# Patient Record
Sex: FEMALE | Race: ASIAN | NOT HISPANIC OR LATINO | URBAN - METROPOLITAN AREA
[De-identification: names, ages, dates, MRNs, and addresses within clinical notes are randomized per-mention and may not be internally consistent; named-entity substitution may affect disease eponyms.]

---

## 2019-08-03 ENCOUNTER — EMERGENCY (EMERGENCY)
Facility: HOSPITAL | Age: 49
LOS: 1 days | Discharge: HOME | End: 2019-08-03
Attending: EMERGENCY MEDICINE | Admitting: EMERGENCY MEDICINE
Payer: COMMERCIAL

## 2019-08-03 VITALS
HEART RATE: 80 BPM | OXYGEN SATURATION: 98 % | SYSTOLIC BLOOD PRESSURE: 128 MMHG | WEIGHT: 132.28 LBS | TEMPERATURE: 98 F | RESPIRATION RATE: 17 BRPM | DIASTOLIC BLOOD PRESSURE: 89 MMHG

## 2019-08-03 PROCEDURE — 99283 EMERGENCY DEPT VISIT LOW MDM: CPT

## 2019-08-03 PROCEDURE — 99053 MED SERV 10PM-8AM 24 HR FAC: CPT

## 2019-08-03 NOTE — ED PROVIDER NOTE - NSFOLLOWUPINSTRUCTIONS_ED_ALL_ED_FT
Subconjunctival Hemorrhage  Image   Subconjunctival hemorrhage is bleeding that happens between the white part of your eye (sclera) and the clear membrane that covers the outside of your eye (conjunctiva). There are many tiny blood vessels near the surface of your eye. A subconjunctival hemorrhage happens when one or more of these vessels breaks and bleeds, causing a red patch to appear on your eye. This is similar to a bruise.    Depending on the amount of bleeding, the red patch may only cover a small area of your eye or it may cover the entire visible part of the sclera. If a lot of blood collects under the conjunctiva, there may also be swelling. Subconjunctival hemorrhages do not affect your vision or cause pain, but your eye may feel irritated if there is swelling. Subconjunctival hemorrhages usually do not require treatment, and they disappear on their own within two weeks.    What are the causes?  This condition may be caused by:  Mild trauma, such as rubbing your eye too hard.  Severe trauma or blunt injuries.  Coughing, sneezing, or vomiting.  Straining, such as when lifting a heavy object.  High blood pressure.  Recent eye surgery.  A history of diabetes.  Certain medicines, especially blood thinners (anticoagulants).  Other conditions, such as eye tumors, bleeding disorders, or blood vessel abnormalities.  Subconjunctival hemorrhages can happen without an obvious cause.    What are the signs or symptoms?  Symptoms of this condition include:  A bright red or dark red patch on the white part of the eye.  The red area may spread out to cover a larger area of the eye before it goes away.  The red area may turn brownish-yellow before it goes away.  Swelling.  Mild eye irritation.  How is this diagnosed?  This condition is diagnosed with a physical exam. If your subconjunctival hemorrhage was caused by trauma, your health care provider may refer you to an eye specialist (ophthalmologist) or another specialist to check for other injuries. You may have other tests, including:  An eye exam.  A blood pressure check.  Blood tests to check for bleeding disorders.  If your subconjunctival hemorrhage was caused by trauma, X-rays or a CT scan may be done to check for other injuries.    How is this treated?  Usually, no treatment is needed. Your health care provider may recommend eye drops or cold compresses to help with discomfort.    Follow these instructions at home:  Take over-the-counter and prescription medicines only as directed by your health care provider.  Use eye drops or cold compresses to help with discomfort as directed by your health care provider.  Avoid activities, things, and environments that may irritate or injure your eye.  Keep all follow-up visits as told by your health care provider. This is important.  Contact a health care provider if:  You have pain in your eye.  The bleeding does not go away within 3 weeks.  You keep getting new subconjunctival hemorrhages.  Get help right away if:  Your vision changes or you have difficulty seeing.  You suddenly develop severe sensitivity to light.  You develop a severe headache, persistent vomiting, confusion, or abnormal tiredness (lethargy).  Your eye seems to bulge or protrude from your eye socket.  You develop unexplained bruises on your body.  You have unexplained bleeding in another area of your body.  This information is not intended to replace advice given to you by your health care provider. Make sure you discuss any questions you have with your health care provider.

## 2019-08-03 NOTE — ED ADULT NURSE NOTE - NSIMPLEMENTINTERV_GEN_ALL_ED
Implemented All Universal Safety Interventions:  [place] to call system. Call bell, personal items and telephone within reach. Instruct patient to call for assistance. Room bathroom lighting operational. Non-slip footwear when patient is off stretcher. Physically safe environment: no spills, clutter or unnecessary equipment. Stretcher in lowest position, wheels locked, appropriate side rails in place.

## 2019-08-03 NOTE — ED ADULT NURSE REASSESSMENT NOTE - NS ED NURSE REASSESS COMMENT FT1
visual acuity done with patient wearing her glasses : right eye 20/25, left eye 20/25, both eyes 20/20

## 2019-08-03 NOTE — ED PROVIDER NOTE - OBJECTIVE STATEMENT
Pt p/w R eye redness Pt w/ PMHx iritis, breast CA, PSHx Lasik, p/w atraumatic R eye redness, onset this morning. No vision changes. No HA, n/v. No photophobia. No eye drainage. No eye pain.

## 2019-08-03 NOTE — ED PROVIDER NOTE - CLINICAL SUMMARY MEDICAL DECISION MAKING FREE TEXT BOX
Pt p/w LUÍS. No uptake on fluorescein exam. No FB. Normal visual acuity and IOP. Reassurance, LUÍS will resolve on its own.

## 2019-08-03 NOTE — ED PROVIDER NOTE - NS ED ROS FT
Constitutional: No fever or chills.   Eyes: See HPI  ENMT: No hearing changes, pain, discharge or infections. No neck pain or stiffness.  Cardiac: No chest pain, SOB or edema. No chest pain with exertion.  Respiratory: No cough or respiratory distress. No hemoptysis. No history of asthma or RAD.  GI: No nausea, vomiting, diarrhea or abdominal pain.  : No dysuria, frequency or burning.  MS: No myalgia, muscle weakness, joint pain or back pain.  Neuro: No headache or weakness. No LOC.  Skin: No skin rash.   Endocrine: No history of thyroid disease or diabetes.  Except as documented in the HPI, all other systems are negative.

## 2019-08-03 NOTE — ED PROVIDER NOTE - PHYSICAL EXAMINATION
Constitutional: Well appearing, well nourished, awake, alert, oriented to person, place, time/situation and in no apparent distress.  ENMT: Airway patent. Normal MM  Eyes: Clear bilaterally, PERRL, EOMI. + LUÍS on R. No uptake fluorescin exam. No FB. IOP 15 on R. Visual acuity 20/25 R, 20/25 L, 202/20 together (corrected)  Neuro: Alert and oriented x 3, face symmetric and speech fluent. Strength 5/5 x 4 ext and symmetric, nml gross motor movement, nml gait. No focal deficits noted.  Skin: Skin normal color for race, warm, dry and intact. No evidence of rash.  Psych: Alert and oriented to person, place, time/situation. normal mood and affect. no apparent risk to self or others.

## 2019-08-03 NOTE — ED ADULT NURSE NOTE - OBJECTIVE STATEMENT
Patient presents to the ED with atraumatic right eye redness x this morning.  Patient is a  and flew to [facility] from [place] on an overnight flight.  she states that her friend noticed the redness.  denies any vision changes, however due to hx of iritis she wanted to be seen.  AA&OX3, respirations unlabored,  non-diaphoretic, no pallor.  Redness to right eye, pupil normal.

## 2019-08-03 NOTE — ED ADULT TRIAGE NOTE - CHIEF COMPLAINT QUOTE
pt. c/o Rt eye redness since today, denies any pmh, no injury, no headache, no vision change reported.

## 2019-08-11 DIAGNOSIS — H57.89 OTHER SPECIFIED DISORDERS OF EYE AND ADNEXA: ICD-10-CM

## 2019-08-11 DIAGNOSIS — H11.31 CONJUNCTIVAL HEMORRHAGE, RIGHT EYE: ICD-10-CM

## 2020-08-19 NOTE — ED ADULT NURSE NOTE - PRO INTERPRETER NEED 2
English Glycopyrrolate Pregnancy And Lactation Text: This medication is Pregnancy Category B and is considered safe during pregnancy. It is unknown if it is excreted breast milk.